# Patient Record
Sex: FEMALE | Race: WHITE | NOT HISPANIC OR LATINO | Employment: STUDENT | URBAN - METROPOLITAN AREA
[De-identification: names, ages, dates, MRNs, and addresses within clinical notes are randomized per-mention and may not be internally consistent; named-entity substitution may affect disease eponyms.]

---
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
## 2025-01-13 ENCOUNTER — APPOINTMENT (OUTPATIENT)
Dept: BEHAVIORAL HEALTH | Facility: CLINIC | Age: 20
End: 2025-01-13
Payer: COMMERCIAL

## 2025-01-13 DIAGNOSIS — F41.1 GAD (GENERALIZED ANXIETY DISORDER): ICD-10-CM

## 2025-01-13 DIAGNOSIS — F90.0 ATTENTION DEFICIT HYPERACTIVITY DISORDER (ADHD), PREDOMINANTLY INATTENTIVE TYPE: ICD-10-CM

## 2025-01-13 PROCEDURE — 90785 PSYTX COMPLEX INTERACTIVE: CPT | Performed by: PSYCHOLOGIST

## 2025-01-13 PROCEDURE — 90837 PSYTX W PT 60 MINUTES: CPT | Performed by: PSYCHOLOGIST

## 2025-01-13 NOTE — PSYCHOTHERAPY
Outpatient Virtual Encounter    Nature of encounter: Psychotherapy 60 minutes with patient and family.    Start time 3:00  ; end time: 4:00  Duration: 55 minutes.  Patient and parents were located at Home  Chief complaint: Severe non-compliance, irritability, avoidance of daily responsibilities, academic decline, recurrent parent-child conflict.    Diagnoses:    1. Attention deficit hyperactivity disorder (ADHD), predominantly inattentive type        2. RAY (generalized anxiety disorder)            Symptoms: Severe non-compliance, irritability, avoidance of daily responsibilities, academic decline, recurrent parent-child conflict.  Functional status: Poor functioning evident in emotional, social and academic domains.    Focused mental status: Patient was oriented to person, place, time and context; and expressed a foul mood.  Treatment plan: Increase functioning in emotional, social and academic domains.    Treatment modality/frequency: Behavioral family therapy, weekly.    Prognosis: Guarded.    Progress since last encounter: Moderate as patient reported an ED visit last night secondary to a panic attack.   Patient homework: Monitor mood, adhere to a structured schedule, and rely on time-management system.

## 2025-01-14 ENCOUNTER — TELEMEDICINE (OUTPATIENT)
Dept: BEHAVIORAL HEALTH | Facility: CLINIC | Age: 20
End: 2025-01-14
Payer: COMMERCIAL

## 2025-01-14 DIAGNOSIS — F41.1 GAD (GENERALIZED ANXIETY DISORDER): ICD-10-CM

## 2025-01-14 DIAGNOSIS — F90.0 ATTENTION DEFICIT HYPERACTIVITY DISORDER (ADHD), PREDOMINANTLY INATTENTIVE TYPE: ICD-10-CM

## 2025-01-14 PROCEDURE — 90785 PSYTX COMPLEX INTERACTIVE: CPT | Performed by: PSYCHOLOGIST

## 2025-01-14 PROCEDURE — 90837 PSYTX W PT 60 MINUTES: CPT | Performed by: PSYCHOLOGIST

## 2025-01-14 NOTE — PSYCHOTHERAPY
Outpatient Virtual Encounter    Nature of encounter: Psychotherapy 60 minutes with patient and family.    Start time 10:00  ; end time: 11:00  Duration: 55 minutes.  Patient and parents were located at Home  Chief complaint: Severe non-compliance, irritability, avoidance of daily responsibilities, academic decline, recurrent parent-child conflict.    Diagnoses:    1. Attention deficit hyperactivity disorder (ADHD), predominantly inattentive type        2. RAY (generalized anxiety disorder)            Symptoms: Severe non-compliance, irritability, avoidance of daily responsibilities, academic decline, recurrent parent-child conflict.  Functional status: Poor functioning evident in emotional, social and academic domains.    Focused mental status: Patient was oriented to person, place, time and context; and expressed a foul mood.  Treatment plan: Increase functioning in emotional, social and academic domains.    Treatment modality/frequency: Behavioral family therapy, weekly.    Prognosis: Guarded.    Progress since last encounter: Mixed as patient as she requested a follow up appointment after her visit to ED due to panic attacks. Session focused on distress management and starting her job.  Patient homework: Create a weekly schedule, assertively manage dysphoria. And maintain a organized personal environment.

## 2025-01-17 ENCOUNTER — TELEMEDICINE (OUTPATIENT)
Dept: BEHAVIORAL HEALTH | Facility: CLINIC | Age: 20
End: 2025-01-17
Payer: COMMERCIAL

## 2025-01-17 ENCOUNTER — APPOINTMENT (OUTPATIENT)
Dept: BEHAVIORAL HEALTH | Facility: CLINIC | Age: 20
End: 2025-01-17
Payer: COMMERCIAL

## 2025-01-17 DIAGNOSIS — F41.1 GAD (GENERALIZED ANXIETY DISORDER): ICD-10-CM

## 2025-01-17 DIAGNOSIS — F33.1 MDD (MAJOR DEPRESSIVE DISORDER), RECURRENT EPISODE, MODERATE: ICD-10-CM

## 2025-01-17 DIAGNOSIS — F90.2 ATTENTION DEFICIT HYPERACTIVITY DISORDER (ADHD), COMBINED TYPE: ICD-10-CM

## 2025-01-17 DIAGNOSIS — F41.0 PANIC: ICD-10-CM

## 2025-01-17 PROCEDURE — 99214 OFFICE O/P EST MOD 30 MIN: CPT | Performed by: PSYCHIATRY & NEUROLOGY

## 2025-01-17 RX ORDER — LORAZEPAM 2 MG/1
1-2 TABLET ORAL DAILY PRN
Qty: 8 TABLET | Refills: 1 | Status: SHIPPED | OUTPATIENT
Start: 2025-01-17 | End: 2025-02-16

## 2025-01-17 RX ORDER — DESVENLAFAXINE 100 MG/1
100 TABLET, EXTENDED RELEASE ORAL DAILY
Qty: 30 TABLET | Refills: 1 | Status: SHIPPED | OUTPATIENT
Start: 2025-01-17 | End: 2025-03-18

## 2025-01-17 RX ORDER — METHYLPHENIDATE HYDROCHLORIDE 40 MG/1
40 CAPSULE, EXTENDED RELEASE ORAL EVERY MORNING
Qty: 30 CAPSULE | Refills: 0 | Status: SHIPPED | OUTPATIENT
Start: 2025-01-17 | End: 2025-02-16

## 2025-01-17 RX ORDER — METHYLPHENIDATE HYDROCHLORIDE 40 MG/1
40 CAPSULE, EXTENDED RELEASE ORAL EVERY MORNING
Qty: 30 CAPSULE | Refills: 0 | Status: SHIPPED | OUTPATIENT
Start: 2025-03-18 | End: 2025-04-17

## 2025-01-17 RX ORDER — METHYLPHENIDATE HYDROCHLORIDE 40 MG/1
40 CAPSULE, EXTENDED RELEASE ORAL EVERY MORNING
Qty: 30 CAPSULE | Refills: 0 | Status: SHIPPED | OUTPATIENT
Start: 2025-02-16 | End: 2025-03-18
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
## 2025-01-29 ENCOUNTER — TELEMEDICINE (OUTPATIENT)
Dept: BEHAVIORAL HEALTH | Facility: CLINIC | Age: 20
End: 2025-01-29
Payer: COMMERCIAL

## 2025-01-29 DIAGNOSIS — F41.1 GAD (GENERALIZED ANXIETY DISORDER): ICD-10-CM

## 2025-01-29 DIAGNOSIS — F90.0 ATTENTION DEFICIT HYPERACTIVITY DISORDER (ADHD), PREDOMINANTLY INATTENTIVE TYPE: ICD-10-CM

## 2025-01-29 PROCEDURE — 90785 PSYTX COMPLEX INTERACTIVE: CPT | Performed by: PSYCHOLOGIST

## 2025-01-29 PROCEDURE — 90837 PSYTX W PT 60 MINUTES: CPT | Performed by: PSYCHOLOGIST

## 2025-01-29 NOTE — PSYCHOTHERAPY
Outpatient Virtual Encounter    Nature of encounter: Psychotherapy 60 minutes with patient and family.    Start time 9:00  ; end time: 10:00  Duration: 55 minutes.  Patient and parents were located at Home  Chief complaint: Severe non-compliance, irritability, avoidance of daily responsibilities, academic decline, recurrent parent-child conflict.    Diagnoses:    1. Attention deficit hyperactivity disorder (ADHD), predominantly inattentive type        2. RAY (generalized anxiety disorder)            Symptoms: Severe non-compliance, irritability, avoidance of daily responsibilities, academic decline, recurrent parent-child conflict.  Functional status: Poor functioning evident in emotional, social and academic domains.    Focused mental status: Patient was oriented to person, place, time and context; and expressed a foul mood.  Treatment plan: Increase functioning in emotional, social and academic domains.    Treatment modality/frequency: Behavioral family therapy, weekly.    Prognosis: Guarded.    Progress since last encounter: Patient reports considerable GI symptoms she does not know what may be causing it. Patient started to work a part-time job last week. She reports having difficulties standing at work due to pain on her ankles.   Patient homework: Patient to ask the manager on duty to teach her specific tasks.

## 2025-02-23 ASSESSMENT — ENCOUNTER SYMPTOMS: COUGH: 1
